# Patient Record
(demographics unavailable — no encounter records)

---

## 2025-06-11 NOTE — HISTORY OF PRESENT ILLNESS
[de-identified] : This 68-year-old male was last seen on 8/3/2022 for lumbar radiculitis.  The patient has done fairly well until 3 weeks ago when he began feeling pain especially in the lumbar spine when standing.  The patient first had pain in the lumbar area 24 years ago and then in 2022.  He did go to the emergency room at [facility] where x-rays were done of the lumbar spine which revealed some minimal deep generative disc disease at L5-S1.  Patient has come to this office for orthopedic evaluation.

## 2025-06-11 NOTE — ASSESSMENT
[FreeTextEntry1] : Right lumbar radiculitis Degenerative disc disease L5-S1 DJD lumbar spine  This patient will be treated with physical therapy and meloxicam.  He will return in 2 weeks for reevaluation.  If he continues to have pain he will undergo trigger point injections.

## 2025-06-11 NOTE — PHYSICAL EXAM
[de-identified] : Review of x-rays of the lumbosacral spine from 6/9/2025 taken at [facility] revealed mild degenerative disc disease at L5-S1 with some minimal degenerative changes diffusely. [de-identified] : On physical examination the patient has bilateral lumbar muscle spasm worse on the right than the left.  Patient lacks full flexion and extension of the lumbar spine.  Right and left lateral bending increases pain.  Straight leg raising test is positive on the right at 50 degrees.  Motor sensory and deep tendon reflex examination of both lower extremities is within normal limits.

## 2025-06-24 NOTE — ASSESSMENT
[FreeTextEntry1] : Resolving lumbar radiculitis  Patient with continued right symptomatic treatment and he will return on a as needed basis.

## 2025-06-24 NOTE — HISTORY OF PRESENT ILLNESS
[de-identified] : This 68-year-old male returns for reevaluation radiculitis.  Patient had responded well to conservative treatment.  He no longer has significant back or leg pain.

## 2025-06-24 NOTE — PHYSICAL EXAM
[de-identified] : On physical examination the patient has minimal tenderness and muscle spasm in the right and left lumbar musculature.  His range of motion though is unchanged from previous exam.